# Patient Record
Sex: FEMALE | Race: WHITE | HISPANIC OR LATINO | URBAN - METROPOLITAN AREA
[De-identification: names, ages, dates, MRNs, and addresses within clinical notes are randomized per-mention and may not be internally consistent; named-entity substitution may affect disease eponyms.]

---

## 2019-04-08 ENCOUNTER — WALK IN (OUTPATIENT)
Dept: URGENT CARE | Age: 29
End: 2019-04-08

## 2019-04-08 DIAGNOSIS — Z23 NEED FOR DIPHTHERIA-TETANUS-PERTUSSIS (TDAP) VACCINE: Primary | ICD-10-CM

## 2019-04-08 PROCEDURE — 90471 IMMUNIZATION ADMIN: CPT | Performed by: NURSE PRACTITIONER

## 2019-04-08 PROCEDURE — 90715 TDAP VACCINE 7 YRS/> IM: CPT | Performed by: NURSE PRACTITIONER

## 2019-07-17 ENCOUNTER — APPOINTMENT (OUTPATIENT)
Dept: CT IMAGING | Facility: HOSPITAL | Age: 29
End: 2019-07-17
Attending: PHYSICIAN ASSISTANT

## 2019-07-17 ENCOUNTER — HOSPITAL ENCOUNTER (EMERGENCY)
Facility: HOSPITAL | Age: 29
Discharge: HOME | End: 2019-07-17
Attending: EMERGENCY MEDICINE

## 2019-07-17 ENCOUNTER — APPOINTMENT (OUTPATIENT)
Dept: GENERAL RADIOLOGY | Facility: HOSPITAL | Age: 29
End: 2019-07-17
Attending: PHYSICIAN ASSISTANT

## 2019-07-17 VITALS
RESPIRATION RATE: 15 BRPM | DIASTOLIC BLOOD PRESSURE: 74 MMHG | TEMPERATURE: 97 F | HEIGHT: 65 IN | WEIGHT: 170 LBS | BODY MASS INDEX: 28.32 KG/M2 | OXYGEN SATURATION: 99 % | SYSTOLIC BLOOD PRESSURE: 106 MMHG | HEART RATE: 82 BPM

## 2019-07-17 DIAGNOSIS — R42 DIZZINESS: ICD-10-CM

## 2019-07-17 DIAGNOSIS — S39.012A STRAIN OF LUMBAR REGION, INITIAL ENCOUNTER: ICD-10-CM

## 2019-07-17 DIAGNOSIS — S16.1XXA STRAIN OF NECK MUSCLE, INITIAL ENCOUNTER: Primary | ICD-10-CM
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
## 2019-07-17 PROCEDURE — 99284 EMERGENCY DEPT VISIT MOD MDM: CPT

## 2019-07-17 PROCEDURE — 72100 X-RAY EXAM L-S SPINE 2/3 VWS: CPT | Performed by: PHYSICIAN ASSISTANT

## 2019-07-17 PROCEDURE — 72125 CT NECK SPINE W/O DYE: CPT | Performed by: PHYSICIAN ASSISTANT

## 2019-07-17 PROCEDURE — 70450 CT HEAD/BRAIN W/O DYE: CPT | Performed by: PHYSICIAN ASSISTANT

## 2019-07-17 RX ORDER — IBUPROFEN 600 MG/1
600 TABLET ORAL ONCE
Status: COMPLETED | OUTPATIENT
Start: 2019-07-17 | End: 2019-07-17

## 2019-07-17 RX ORDER — MECLIZINE HYDROCHLORIDE 25 MG/1
25 TABLET ORAL 3 TIMES DAILY PRN
Qty: 10 TABLET | Refills: 0 | Status: SHIPPED | OUTPATIENT
Start: 2019-07-17

## 2019-07-17 RX ORDER — ONDANSETRON 4 MG/1
4 TABLET, ORALLY DISINTEGRATING ORAL EVERY 4 HOURS PRN
Qty: 10 TABLET | Refills: 0 | Status: SHIPPED | OUTPATIENT
Start: 2019-07-17 | End: 2019-07-24

## 2019-07-17 RX ORDER — MECLIZINE HYDROCHLORIDE 25 MG/1
25 TABLET ORAL ONCE
Status: COMPLETED | OUTPATIENT
Start: 2019-07-17 | End: 2019-07-17

## 2019-07-17 RX ORDER — METAXALONE 800 MG/1
800 TABLET ORAL 3 TIMES DAILY PRN
Qty: 12 TABLET | Refills: 0 | Status: SHIPPED | OUTPATIENT
Start: 2019-07-17

## 2019-07-17 RX ORDER — IBUPROFEN 600 MG/1
600 TABLET ORAL EVERY 6 HOURS
Qty: 20 TABLET | Refills: 0 | Status: SHIPPED | OUTPATIENT
Start: 2019-07-17 | End: 2019-07-22

## 2019-07-17 RX ORDER — ACETAMINOPHEN 500 MG
1000 TABLET ORAL ONCE
Status: COMPLETED | OUTPATIENT
Start: 2019-07-17 | End: 2019-07-17

## 2019-07-17 RX ORDER — ONDANSETRON 4 MG/1
4 TABLET, ORALLY DISINTEGRATING ORAL ONCE
Status: COMPLETED | OUTPATIENT
Start: 2019-07-17 | End: 2019-07-17
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
## 2024-06-14 ENCOUNTER — HOSPITAL ENCOUNTER (EMERGENCY)
Facility: HOSPITAL | Age: 34
Discharge: HOME | End: 2024-06-15
Attending: EMERGENCY MEDICINE

## 2024-06-14 ENCOUNTER — ANESTHESIA EVENT (OUTPATIENT)
Dept: SURGERY | Facility: HOSPITAL | Age: 34
End: 2024-06-14
Payer: MEDICAID

## 2024-06-14 ENCOUNTER — ANESTHESIA (OUTPATIENT)
Dept: SURGERY | Facility: HOSPITAL | Age: 34
End: 2024-06-14
Payer: MEDICAID

## 2024-06-14 DIAGNOSIS — O00.101 RIGHT TUBAL PREGNANCY WITHOUT INTRAUTERINE PREGNANCY (HCC): Primary | ICD-10-CM

## 2024-06-14 DIAGNOSIS — O00.90 ECTOPIC PREGNANCY (HCC): Primary | ICD-10-CM

## 2024-06-14 DIAGNOSIS — O00.90 ECTOPIC PREGNANCY (HCC): ICD-10-CM

## 2024-06-14 LAB
ANION GAP SERPL CALC-SCNC: 9 MMOL/L (ref 0–18)
ANTIBODY SCREEN: NEGATIVE
BASOPHILS # BLD AUTO: 0.02 X10(3) UL (ref 0–0.2)
BASOPHILS NFR BLD AUTO: 0.2 %
BILIRUB UR QL STRIP.AUTO: NEGATIVE
BUN BLD-MCNC: 9 MG/DL (ref 9–23)
CALCIUM BLD-MCNC: 9.5 MG/DL (ref 8.5–10.1)
CHLORIDE SERPL-SCNC: 106 MMOL/L (ref 98–112)
CLARITY UR REFRACT.AUTO: CLEAR
CO2 SERPL-SCNC: 21 MMOL/L (ref 21–32)
CREAT BLD-MCNC: 0.74 MG/DL
EGFRCR SERPLBLD CKD-EPI 2021: 111 ML/MIN/1.73M2 (ref 60–?)
EOSINOPHIL # BLD AUTO: 0.01 X10(3) UL (ref 0–0.7)
EOSINOPHIL NFR BLD AUTO: 0.1 %
ERYTHROCYTE [DISTWIDTH] IN BLOOD BY AUTOMATED COUNT: 14.1 %
GLUCOSE BLD-MCNC: 110 MG/DL (ref 70–99)
GLUCOSE UR STRIP.AUTO-MCNC: NORMAL MG/DL
HCT VFR BLD AUTO: 37 %
HGB BLD-MCNC: 12.9 G/DL
IMM GRANULOCYTES # BLD AUTO: 0.05 X10(3) UL (ref 0–1)
IMM GRANULOCYTES NFR BLD: 0.4 %
LEUKOCYTE ESTERASE UR QL STRIP.AUTO: 25
LYMPHOCYTES # BLD AUTO: 1.85 X10(3) UL (ref 1–4)
LYMPHOCYTES NFR BLD AUTO: 14.5 %
MCH RBC QN AUTO: 30.4 PG (ref 26–34)
MCHC RBC AUTO-ENTMCNC: 34.9 G/DL (ref 31–37)
MCV RBC AUTO: 87.1 FL
MONOCYTES # BLD AUTO: 0.22 X10(3) UL (ref 0.1–1)
MONOCYTES NFR BLD AUTO: 1.7 %
NEUTROPHILS # BLD AUTO: 10.64 X10 (3) UL (ref 1.5–7.7)
NEUTROPHILS # BLD AUTO: 10.64 X10(3) UL (ref 1.5–7.7)
NEUTROPHILS NFR BLD AUTO: 83.1 %
NITRITE UR QL STRIP.AUTO: NEGATIVE
OSMOLALITY SERPL CALC.SUM OF ELEC: 281 MOSM/KG (ref 275–295)
PH UR STRIP.AUTO: 6 [PH] (ref 5–8)
PLATELET # BLD AUTO: 353 10(3)UL (ref 150–450)
POTASSIUM SERPL-SCNC: 3.8 MMOL/L (ref 3.5–5.1)
PROT UR STRIP.AUTO-MCNC: NEGATIVE MG/DL
RBC # BLD AUTO: 4.25 X10(6)UL
RH BLOOD TYPE: POSITIVE
SODIUM SERPL-SCNC: 136 MMOL/L (ref 136–145)
SP GR UR STRIP.AUTO: 1.01 (ref 1–1.03)
UROBILINOGEN UR STRIP.AUTO-MCNC: NORMAL MG/DL
WBC # BLD AUTO: 12.8 X10(3) UL (ref 4–11)

## 2024-06-14 PROCEDURE — 86900 BLOOD TYPING SEROLOGIC ABO: CPT | Performed by: EMERGENCY MEDICINE

## 2024-06-14 PROCEDURE — 87086 URINE CULTURE/COLONY COUNT: CPT | Performed by: EMERGENCY MEDICINE

## 2024-06-14 PROCEDURE — 80048 BASIC METABOLIC PNL TOTAL CA: CPT | Performed by: EMERGENCY MEDICINE

## 2024-06-14 PROCEDURE — 86901 BLOOD TYPING SEROLOGIC RH(D): CPT | Performed by: EMERGENCY MEDICINE

## 2024-06-14 PROCEDURE — 36415 COLL VENOUS BLD VENIPUNCTURE: CPT

## 2024-06-14 PROCEDURE — 99285 EMERGENCY DEPT VISIT HI MDM: CPT

## 2024-06-14 PROCEDURE — 86850 RBC ANTIBODY SCREEN: CPT | Performed by: EMERGENCY MEDICINE

## 2024-06-14 PROCEDURE — 87106 FUNGI IDENTIFICATION YEAST: CPT | Performed by: EMERGENCY MEDICINE

## 2024-06-14 PROCEDURE — 81001 URINALYSIS AUTO W/SCOPE: CPT | Performed by: EMERGENCY MEDICINE

## 2024-06-14 PROCEDURE — 85025 COMPLETE CBC W/AUTO DIFF WBC: CPT | Performed by: EMERGENCY MEDICINE

## 2024-06-14 PROCEDURE — 87077 CULTURE AEROBIC IDENTIFY: CPT | Performed by: EMERGENCY MEDICINE

## 2024-06-14 RX ADMIN — SODIUM CHLORIDE, SODIUM LACTATE, POTASSIUM CHLORIDE, CALCIUM CHLORIDE: 600; 310; 30; 20 INJECTION, SOLUTION INTRAVENOUS at 23:58:00

## 2024-06-15 ENCOUNTER — HOSPITAL ENCOUNTER (EMERGENCY)
Facility: HOSPITAL | Age: 34
Discharge: STILL ADMITTED OR ADMINISTRATIVE | End: 2024-06-15

## 2024-06-15 VITALS
RESPIRATION RATE: 16 BRPM | BODY MASS INDEX: 33.15 KG/M2 | SYSTOLIC BLOOD PRESSURE: 126 MMHG | DIASTOLIC BLOOD PRESSURE: 71 MMHG | TEMPERATURE: 99 F | HEIGHT: 65 IN | OXYGEN SATURATION: 98 % | HEART RATE: 100 BPM | WEIGHT: 199 LBS

## 2024-06-15 LAB — RH BLOOD TYPE: POSITIVE

## 2024-06-15 PROCEDURE — 10T24ZZ RESECTION OF PRODUCTS OF CONCEPTION, ECTOPIC, PERCUTANEOUS ENDOSCOPIC APPROACH: ICD-10-PCS | Performed by: OBSTETRICS & GYNECOLOGY

## 2024-06-15 PROCEDURE — 88305 TISSUE EXAM BY PATHOLOGIST: CPT | Performed by: OBSTETRICS & GYNECOLOGY

## 2024-06-15 RX ORDER — PROCHLORPERAZINE EDISYLATE 5 MG/ML
5 INJECTION INTRAMUSCULAR; INTRAVENOUS EVERY 8 HOURS PRN
Status: DISCONTINUED | OUTPATIENT
Start: 2024-06-15 | End: 2024-06-15

## 2024-06-15 RX ORDER — HYDROCODONE BITARTRATE AND ACETAMINOPHEN 5; 325 MG/1; MG/1
TABLET ORAL
Status: COMPLETED
Start: 2024-06-15 | End: 2024-06-15

## 2024-06-15 RX ORDER — EPHEDRINE SULFATE 50 MG/ML
INJECTION INTRAVENOUS AS NEEDED
Status: DISCONTINUED | OUTPATIENT
Start: 2024-06-15 | End: 2024-06-15 | Stop reason: SURG

## 2024-06-15 RX ORDER — ROCURONIUM BROMIDE 10 MG/ML
INJECTION, SOLUTION INTRAVENOUS AS NEEDED
Status: DISCONTINUED | OUTPATIENT
Start: 2024-06-15 | End: 2024-06-15 | Stop reason: SURG

## 2024-06-15 RX ORDER — NALOXONE HYDROCHLORIDE 0.4 MG/ML
0.08 INJECTION, SOLUTION INTRAMUSCULAR; INTRAVENOUS; SUBCUTANEOUS AS NEEDED
Status: DISCONTINUED | OUTPATIENT
Start: 2024-06-15 | End: 2024-06-15

## 2024-06-15 RX ORDER — PHENYLEPHRINE HCL 10 MG/ML
VIAL (ML) INJECTION AS NEEDED
Status: DISCONTINUED | OUTPATIENT
Start: 2024-06-15 | End: 2024-06-15 | Stop reason: SURG

## 2024-06-15 RX ORDER — GLYCOPYRROLATE 0.2 MG/ML
INJECTION, SOLUTION INTRAMUSCULAR; INTRAVENOUS AS NEEDED
Status: DISCONTINUED | OUTPATIENT
Start: 2024-06-15 | End: 2024-06-15 | Stop reason: SURG

## 2024-06-15 RX ORDER — HYDROMORPHONE HYDROCHLORIDE 1 MG/ML
0.2 INJECTION, SOLUTION INTRAMUSCULAR; INTRAVENOUS; SUBCUTANEOUS EVERY 5 MIN PRN
Status: DISCONTINUED | OUTPATIENT
Start: 2024-06-15 | End: 2024-06-15

## 2024-06-15 RX ORDER — ONDANSETRON 2 MG/ML
INJECTION INTRAMUSCULAR; INTRAVENOUS AS NEEDED
Status: DISCONTINUED | OUTPATIENT
Start: 2024-06-15 | End: 2024-06-15 | Stop reason: SURG

## 2024-06-15 RX ORDER — NEOSTIGMINE METHYLSULFATE 1 MG/ML
INJECTION, SOLUTION INTRAVENOUS AS NEEDED
Status: DISCONTINUED | OUTPATIENT
Start: 2024-06-15 | End: 2024-06-15 | Stop reason: SURG

## 2024-06-15 RX ORDER — SODIUM CHLORIDE, SODIUM LACTATE, POTASSIUM CHLORIDE, CALCIUM CHLORIDE 600; 310; 30; 20 MG/100ML; MG/100ML; MG/100ML; MG/100ML
INJECTION, SOLUTION INTRAVENOUS CONTINUOUS PRN
Status: DISCONTINUED | OUTPATIENT
Start: 2024-06-14 | End: 2024-06-15 | Stop reason: SURG

## 2024-06-15 RX ORDER — HYDROCODONE BITARTRATE AND ACETAMINOPHEN 5; 325 MG/1; MG/1
1 TABLET ORAL ONCE AS NEEDED
Status: COMPLETED | OUTPATIENT
Start: 2024-06-15 | End: 2024-06-15

## 2024-06-15 RX ORDER — HYDROMORPHONE HYDROCHLORIDE 1 MG/ML
0.4 INJECTION, SOLUTION INTRAMUSCULAR; INTRAVENOUS; SUBCUTANEOUS EVERY 5 MIN PRN
Status: DISCONTINUED | OUTPATIENT
Start: 2024-06-15 | End: 2024-06-15

## 2024-06-15 RX ORDER — HYDROMORPHONE HYDROCHLORIDE 1 MG/ML
0.6 INJECTION, SOLUTION INTRAMUSCULAR; INTRAVENOUS; SUBCUTANEOUS EVERY 5 MIN PRN
Status: DISCONTINUED | OUTPATIENT
Start: 2024-06-15 | End: 2024-06-15

## 2024-06-15 RX ORDER — HYDROCODONE BITARTRATE AND ACETAMINOPHEN 5; 325 MG/1; MG/1
2 TABLET ORAL ONCE AS NEEDED
Status: COMPLETED | OUTPATIENT
Start: 2024-06-15 | End: 2024-06-15

## 2024-06-15 RX ORDER — DIPHENHYDRAMINE HYDROCHLORIDE 50 MG/ML
12.5 INJECTION INTRAMUSCULAR; INTRAVENOUS AS NEEDED
Status: DISCONTINUED | OUTPATIENT
Start: 2024-06-15 | End: 2024-06-15

## 2024-06-15 RX ORDER — ACETAMINOPHEN 500 MG
1000 TABLET ORAL ONCE AS NEEDED
Status: COMPLETED | OUTPATIENT
Start: 2024-06-15 | End: 2024-06-15

## 2024-06-15 RX ORDER — SODIUM CHLORIDE, SODIUM LACTATE, POTASSIUM CHLORIDE, CALCIUM CHLORIDE 600; 310; 30; 20 MG/100ML; MG/100ML; MG/100ML; MG/100ML
INJECTION, SOLUTION INTRAVENOUS CONTINUOUS
Status: DISCONTINUED | OUTPATIENT
Start: 2024-06-15 | End: 2024-06-15

## 2024-06-15 RX ORDER — MIDAZOLAM HYDROCHLORIDE 1 MG/ML
1 INJECTION INTRAMUSCULAR; INTRAVENOUS EVERY 5 MIN PRN
Status: DISCONTINUED | OUTPATIENT
Start: 2024-06-15 | End: 2024-06-15

## 2024-06-15 RX ORDER — BUPIVACAINE HYDROCHLORIDE 5 MG/ML
INJECTION, SOLUTION EPIDURAL; INTRACAUDAL AS NEEDED
Status: DISCONTINUED | OUTPATIENT
Start: 2024-06-15 | End: 2024-06-15

## 2024-06-15 RX ORDER — KETOROLAC TROMETHAMINE 30 MG/ML
INJECTION, SOLUTION INTRAMUSCULAR; INTRAVENOUS AS NEEDED
Status: DISCONTINUED | OUTPATIENT
Start: 2024-06-15 | End: 2024-06-15 | Stop reason: SURG

## 2024-06-15 RX ORDER — MEPERIDINE HYDROCHLORIDE 25 MG/ML
12.5 INJECTION INTRAMUSCULAR; INTRAVENOUS; SUBCUTANEOUS AS NEEDED
Status: DISCONTINUED | OUTPATIENT
Start: 2024-06-15 | End: 2024-06-15

## 2024-06-15 RX ORDER — LIDOCAINE HYDROCHLORIDE 10 MG/ML
INJECTION, SOLUTION EPIDURAL; INFILTRATION; INTRACAUDAL; PERINEURAL AS NEEDED
Status: DISCONTINUED | OUTPATIENT
Start: 2024-06-15 | End: 2024-06-15 | Stop reason: SURG

## 2024-06-15 RX ORDER — DEXAMETHASONE SODIUM PHOSPHATE 4 MG/ML
VIAL (ML) INJECTION AS NEEDED
Status: DISCONTINUED | OUTPATIENT
Start: 2024-06-15 | End: 2024-06-15 | Stop reason: SURG

## 2024-06-15 RX ORDER — ONDANSETRON 2 MG/ML
4 INJECTION INTRAMUSCULAR; INTRAVENOUS EVERY 6 HOURS PRN
Status: DISCONTINUED | OUTPATIENT
Start: 2024-06-15 | End: 2024-06-15

## 2024-06-15 RX ADMIN — EPHEDRINE SULFATE 5 MG: 50 INJECTION INTRAVENOUS at 00:23:00

## 2024-06-15 RX ADMIN — ROCURONIUM BROMIDE 10 MG: 10 INJECTION, SOLUTION INTRAVENOUS at 01:28:00

## 2024-06-15 RX ADMIN — PHENYLEPHRINE HCL 50 MCG: 10 MG/ML VIAL (ML) INJECTION at 00:23:00

## 2024-06-15 RX ADMIN — DEXAMETHASONE SODIUM PHOSPHATE 8 MG: 4 MG/ML VIAL (ML) INJECTION at 00:06:00

## 2024-06-15 RX ADMIN — NEOSTIGMINE METHYLSULFATE 5 MG: 1 INJECTION, SOLUTION INTRAVENOUS at 01:57:00

## 2024-06-15 RX ADMIN — ONDANSETRON 4 MG: 2 INJECTION INTRAMUSCULAR; INTRAVENOUS at 01:38:00

## 2024-06-15 RX ADMIN — EPHEDRINE SULFATE 5 MG: 50 INJECTION INTRAVENOUS at 00:27:00

## 2024-06-15 RX ADMIN — KETOROLAC TROMETHAMINE 30 MG: 30 INJECTION, SOLUTION INTRAMUSCULAR; INTRAVENOUS at 01:39:00

## 2024-06-15 RX ADMIN — ROCURONIUM BROMIDE 50 MG: 10 INJECTION, SOLUTION INTRAVENOUS at 00:03:00

## 2024-06-15 RX ADMIN — LIDOCAINE HYDROCHLORIDE 50 MG: 10 INJECTION, SOLUTION EPIDURAL; INFILTRATION; INTRACAUDAL; PERINEURAL at 00:01:00

## 2024-06-15 RX ADMIN — GLYCOPYRROLATE 0.8 MG: 0.2 INJECTION, SOLUTION INTRAMUSCULAR; INTRAVENOUS at 01:57:00

## 2024-06-15 NOTE — ED PROVIDER NOTES
Patient Seen in: [facility] Emergency Department      History     Chief Complaint   Patient presents with    Abdomen/Flank Pain     8 wks abdominal pain onset 3pm spotting blood     Stated Complaint:     Subjective:   HPI    33-year-old female presents for evaluation of abdominal pain.  Patient with acute onset right lower quadrant abdominal pain at 3 PM while at work today.  Pain persisted so she presented to outside hospital.  She was diagnosed with a live right sided tubal pregnancy.  She was being prepped for surgery when she signed out AGAINST MEDICAL ADVICE and came here for second opinion.  Very minimal vaginal bleeding.    Objective:   History reviewed. No pertinent past medical history.           History reviewed. No pertinent surgical history.             Social History     Socioeconomic History    Marital status:    Tobacco Use    Smoking status: Never    Smokeless tobacco: Never   Vaping Use    Vaping status: Never Used   Substance and Sexual Activity    Alcohol use: Never    Drug use: Never     Social Determinants of Health     Financial Resource Strain: Not on File (10/6/2022)    Received from [facility]     Financial Resource Strain     Financial Resource Strain: 0   Food Insecurity: Not at Risk (2024)    Received from [facility]     Food Insecurity     Food: 1   Transportation Needs: Not at Risk (2024)    Received from [facility]     Transportation Needs     Transportation: 1   Physical Activity: Not on File (10/6/2022)    Received from [facility]     Physical Activity     Physical Activity: 0   Stress: Not on File (10/6/2022)    Received from [facility]     Stress     Stress: 0   Social Connections: Not on File (10/6/2022)    Received from [facility]     Social Connections     Social Connections and Isolation: 0   Housing Stability: Not at Risk (2024)    Received from [facility]     Housing Stability     Housin              Review of Systems    Positive for stated complaint:   Other systems are as noted  in HPI.  Constitutional and vital signs reviewed.      All other systems reviewed and negative except as noted above.    Physical Exam     ED Triage Vitals [06/14/24 2115]   /85   Pulse 110   Resp 18   Temp 98.5 °F (36.9 °C)   Temp src Temporal   SpO2 98 %   O2 Device None (Room air)       Current Vitals:   Vital Signs  BP: 120/70  Pulse: 98  Resp: 12  Temp: 98.5 °F (36.9 °C)  Temp src: Temporal  MAP (mmHg): 82    Oxygen Therapy  SpO2: 99 %  O2 Device: None (Room air)            Physical Exam    General: Alert, oriented, no apparent distress  HEENT:  Pupils equal reactive.  Extraocular motions intact. MMM.  Neck: Supple  Lungs: Clear to auscultation bilaterally.  Heart: Regular rate and rhythm.  Abdomen: Soft, lower abdominal tenderness  Skin: No rash.  No edema.  Neurologic: No focal neurologic deficits.  Normal speech pattern  Musculoskeletal: No tenderness or deformity noted.  Full range of motion throughout.        ED Course     Labs Reviewed   URINALYSIS WITH CULTURE REFLEX - Abnormal; Notable for the following components:       Result Value    Ketones Urine Trace (*)     Blood Urine 2+ (*)     Leukocyte Esterase Urine 25 (*)     Bacteria Urine Rare (*)     Squamous Epi. Cells Few (*)     All other components within normal limits   BASIC METABOLIC PANEL (8) - Abnormal; Notable for the following components:    Glucose 110 (*)     All other components within normal limits   CBC W/ DIFFERENTIAL - Abnormal; Notable for the following components:    WBC 12.8 (*)     Neutrophil Absolute Prelim 10.64 (*)     Neutrophil Absolute 10.64 (*)     All other components within normal limits   CBC WITH DIFFERENTIAL WITH PLATELET    Narrative:     The following orders were created for panel order CBC With Differential With Platelet.  Procedure                               Abnormality         Status                     ---------                               -----------         ------                     CBC W/  DIFFERENTIAL[[id number]]          Abnormal            Final result                 Please view results for these tests on the individual orders.   TYPE AND SCREEN    Narrative:     The following orders were created for panel order Type and screen.  Procedure                               Abnormality         Status                     ---------                               -----------         ------                     ABORH (Blood Type)[[id number]]                               In process                 Antibody Screen[[id number]]                                  In process                   Please view results for these tests on the individual orders.   ABORH (BLOOD TYPE)   ANTIBODY SCREEN   URINE CULTURE, ROUTINE        EXAM: OB US LESS THAN 14 WEEKS TRANSABDOMINAL AND TV WO OV DOPPLER    CLINICAL INDICATION: Left pelvic pain    COMPARISON: None    TECHNIQUE:  The pelvis was examined via transabdominal and transvaginal approaches.  A transvaginal approach was performed for more detailed evaluation of the gestational sac and fetus.    FINDINGS:    UTERUS:  No focal myometrial abnormalities are appreciated. The uterus measures 10.2 x 5.9 x 6.1 cm. (CC x AP x WIDTH)    GESTATION: There is a single live ectopic pregnancy in the right adnexa; fetal cardiac activity is demonstrated at a rate of 167 beats per minute.  Crown rump length (CRL) is 1.5 cm, corresponding to an estimated gestational age of 8 weeks, 0 days.      RIGHT ADNEXA: The right ovary measures 2.7 x 2.0 x 2.7 cm. (CC x AP x WIDTH)    LEFT ADNEXA: The left ovary is not identified    OTHER FINDINGS: Endometrial stripe measures approximately 27 mm maximum thickness.  Small complex free fluid in the posterior cul-de-sac, perhaps contributory blood products.    Left ovary is not definitively identified, perhaps obscured by location and/or superimposed bowel gas.    IMPRESSION:  1. Single live ectopic pregnancy in the right adnexa, estimated gestational age of  8 weeks and 0 days by crown-rump length.    2. Small possible blood products in the posterior cul-de-sac.      Dr. Moreno reviewed findings with the clinical service, by phone 6/14/2024 6:40 PM.                MDM      Differential diagnosis includes ruptured ectopic pregnancy, right-sided tubal pregnancy, hemoperitoneum      Spoke with Dr. Braxton        Hemoglobin 12.9.    Type and screen in the lab.    Vital signs remained stable while observed                     Medical Decision Making  Amount and/or Complexity of Data Reviewed  External Data Reviewed: notes.     Details: H&P note from earlier today at [facility]        Disposition and Plan     Clinical Impression:  1. Right tubal pregnancy without intrauterine pregnancy (HCC)         Disposition:  There is no disposition on file for this visit.  There is no disposition time on file for this visit.    Follow-up:  No follow-up provider specified.        Medications Prescribed:  Current Discharge Medication List

## 2024-06-15 NOTE — ED QUICK NOTES
Pt is tearful. Pt states she is here for a \"different opinion\" \" she wants to know if there is anything else she can do to save her baby\". Pt left [facility] AMA.

## 2024-06-15 NOTE — ANESTHESIA PREPROCEDURE EVALUATION
PRE-OP EVALUATION    Patient Name: Radha Murry    Admit Diagnosis: Right tubal pregnancy without intrauterine pregnancy (HCC) [O00.101]    Pre-op Diagnosis: Ectopic pregnancy (HCC) [O00.90]    DIAGNOSTIC LAPAROSCOPIC, REMOVAL OF ECTOPIC PREGNANCY POSS. SALPINGECTOMY  POSS. SALPINGO-OOPHERECTOMY- BILATERAL    Anesthesia Procedure: DIAGNOSTIC LAPAROSCOPIC, REMOVAL OF ECTOPIC PREGNANCY POSS. SALPINGECTOMY  POSS. SALPINGO-OOPHERECTOMY- BILATERAL (Bilateral)    Surgeons and Role:     * Gabriele Braxton MD - Primary    Pre-op vitals reviewed.  Temp: 98.5 °F (36.9 °C)  Pulse: 98  Resp: 12  BP: 120/70  SpO2: 99 %  There is no height or weight on file to calculate BMI.    Current medications reviewed.  Hospital Medications:   ceFAZolin (Ancef) 2g in 10mL IV syringe premix   Intravenous PRN    lidocaine PF (Xylocaine-MPF) 1% injection   Injection PRN    propofol (Diprivan) 10 MG/ML injection   Intravenous PRN    succinylcholine (Anectine) 20 MG/ML injection   Intravenous PRN    rocuronium (Zemuron) 50 mg/5mL injection   Intravenous PRN    dexamethasone (Decadron) 4 MG/ML injection   Intravenous PRN    lactated ringers infusion   Intravenous Continuous PRN       Outpatient Medications:     Medications Prior to Admission   Medication Sig Dispense Refill Last Dose    Meclizine HCl 25 MG Oral Tab Take 1 tablet (25 mg total) by mouth 3 (three) times daily as needed. 10 tablet 0     Metaxalone 800 MG Oral Tab Take 1 tablet (800 mg total) by mouth 3 (three) times daily as needed for Pain. 12 tablet 0        Allergies: Patient has no known allergies.      Anesthesia Evaluation    Patient summary reviewed.    Anesthetic Complications  (-) history of anesthetic complications         GI/Hepatic/Renal    Negative GI/hepatic/renal ROS.                             Cardiovascular                (+) obesity                                       Endo/Other    Negative endo/other ROS.                               Pulmonary    Negative pulmonary ROS.                       Neuro/Psych    Negative neuro/psych ROS.                                  History reviewed. No pertinent surgical history.  Social History     Socioeconomic History    Marital status:    Tobacco Use    Smoking status: Never    Smokeless tobacco: Never   Vaping Use    Vaping status: Never Used   Substance and Sexual Activity    Alcohol use: Never    Drug use: Never     History   Drug Use Unknown     Available pre-op labs reviewed.  Lab Results   Component Value Date    WBC 12.8 (H) 06/14/2024    RBC 4.25 06/14/2024    HGB 12.9 06/14/2024    HCT 37.0 06/14/2024    MCV 87.1 06/14/2024    MCH 30.4 06/14/2024    MCHC 34.9 06/14/2024    RDW 14.1 06/14/2024    .0 06/14/2024     Lab Results   Component Value Date     06/14/2024    K 3.8 06/14/2024     06/14/2024    CO2 21.0 06/14/2024    BUN 9 06/14/2024    CREATSERUM 0.74 06/14/2024     (H) 06/14/2024    CA 9.5 06/14/2024            Airway      Mallampati: III  Mouth opening: 3 FB  TM distance: 4 - 6 cm   Cardiovascular             Dental  Comment: No loose teeth per patient               Pulmonary                     Other findings              ASA: 2 and emergent  Plan: general  NPO status verified and   Beta blockers in last 24 hours: .geta.  Post-procedure pain management plan discussed with surgeon and patient.    Comment: GETA/LMA discussed in detail.  Risk of complications discussed including but not limited to sore throat, cough, PONV discussed. Also, discussed risks including dental injury particularly on any weakened, treated or diseased teeth & pt wishes to proceed  All questions answered.    Plan/risks discussed with: patient  Use of blood product(s) discussed with: patient    Consented to blood products.          Present on Admission:  **None**

## 2024-06-15 NOTE — BRIEF OP NOTE
Goal Outcome Evaluation:  Plan of Care Reviewed With: patient        Progress: improving  Outcome Summary: Pt discharged to home with family,   Pre-Operative Diagnosis: Ectopic Pregnancy     Post-Operative Diagnosis: Same With Some Anterior Wall Abdominal Adhesions And Left Ovarian Cyst     Procedure Performed:  Laparoscopy, Operative Laparoscopy, Pelviscopy And Right Sided Salpingectomy With Removal Of Ectopic Pregnancy    Surgeon:  Dr. Braxton    Assistant:  Kaley Mosher CSA     Surgical Findings:  Please See Dictated OR Report     Specimen:  Ectopic Pregnancy And Right Sided Fallopian Tube     Estimated Blood Loss:  50 ml    Gabriele Braxton M.D.  6/15/2024  3:26 AM

## 2024-06-15 NOTE — ANESTHESIA PROCEDURE NOTES
Airway  Date/Time: 6/15/2024 12:04 AM  Urgency: elective      General Information and Staff    Patient location during procedure: OR  Anesthesiologist: Wood Metcalf MD  Performed: anesthesiologist   Performed by: Wood Metcalf MD  Authorized by: Wood Metcalf MD      Indications and Patient Condition  Indications for airway management: anesthesia  Sedation level: deep  Preoxygenated: yes  Patient position: sniffing  Mask difficulty assessment: 0 - not attempted    Final Airway Details  Final airway type: endotracheal airway      Successful airway: ETT  Cuffed: yes   Successful intubation technique: Video laryngoscopy  Facilitating devices/methods: rapid sequence intubation  Endotracheal tube insertion site: oral  Blade: GlideScope  Blade size: #3  ETT size (mm): 7.0    Placement verified by: capnometry   Cuff volume (mL): 8  Measured from: lips  ETT to lips (cm): 21  Number of attempts at approach: 1    Additional Comments  atraumatic

## 2024-06-15 NOTE — ANESTHESIA POSTPROCEDURE EVALUATION
[facility]    Radha Murry Patient Status:  Emergency   Age/Gender 33 year old female MRN [id number]   Location [facility] SURGERY Attending Gabriele Braxton,*   Hosp Day # 0 PCP None Pcp       Anesthesia Post-op Note    DIAGNOSTIC LAPAROSCOPIC, REMOVAL OF ECTOPIC PREGNANCY, SALPINGECTOMY &. SALPINGO-OOPHERECTOMY- RIGHT    Procedure Summary       Date: 06/14/24 Room / Location:  MAIN OR [address] [place] [address] MAIN OR    Anesthesia Start: 2350 Anesthesia Stop: 06/15/24 0214    Procedure: DIAGNOSTIC LAPAROSCOPIC, REMOVAL OF ECTOPIC PREGNANCY, SALPINGECTOMY &. SALPINGO-OOPHERECTOMY- RIGHT (Bilateral: Abdomen) Diagnosis:       Ectopic pregnancy (HCC)      (Ectopic pregnancy (HCC) [O00.90])    Surgeons: Gabriele Braxton MD Anesthesiologist: Wood Metcalf MD    Anesthesia Type: general ASA Status: 2 - Emergent            Anesthesia Type: general    Vitals Value Taken Time   /51 06/15/24 0211   Temp 97 06/15/24 0214   Pulse 105 06/15/24 0213   Resp 14 06/15/24 0213   SpO2 100 % 06/15/24 0213   Vitals shown include unfiled device data.    Patient Location: PACU    Anesthesia Type: general    Airway Patency: patent    Postop Pain Control: adequate    Mental Status: mildly sedated but able to meaningfully participate in the post-anesthesia evaluation    Nausea/Vomiting: none    Cardiopulmonary/Hydration status: stable euvolemic    Complications: no apparent anesthesia related complications    Postop vital signs: stable    Dental Exam: Unchanged from Preop    Patient to be discharged from PACU when criteria met.

## 2024-06-15 NOTE — H&P
[facility]    PATIENT'S NAME: JOSE A GONSALES   ATTENDING PHYSICIAN: Gabriele Braxton M.D.   PATIENT ACCOUNT#:   [id number]    LOCATION:  [facility] OR [facility] 2 [facility] 10  MEDICAL RECORD #:   [id number]       YOB: 1990  ADMISSION DATE:       2024    HISTORY AND PHYSICAL EXAMINATION    This patient was assigned to our service secondary to the fact that our service was on-call for the emergency department at the time that this patient presented.  Please note that since this patient spoke Uzbek, a  was used to obtain information from the patient.    CHIEF COMPLAINT:  The patient presents with a live right-sided ectopic pregnancy.    HISTORY OF PRESENT ILLNESS:  Patient is a 33-year-old female,  2, para 1-0-0-1, last menstrual period was on 2024, with the patient presenting with the above chief complaint.  The patient had noted lower abdominal discomfort starting approximately at 3:00 p.m. on 2024.  The patient had noted vaginal spotting at approximately 7:00 p.m. on 2024.  The patient had gone to another hospital where she was evaluated and was noted to have a live right-sided ectopic pregnancy.  Please see Dr. Casillas's note regarding this patient.  In her note, she states that while the patient was being prepped for surgery (at the other hospital) she signed out AGAINST MEDICAL ADVICE and came here (which would be [facility]) for (a) second opinion.  The patient denies having a fever.  Patient denies having history of pelvic inflammatory disease.      PAST MEDICAL HISTORY:  On 2014, the patient had a  section for apparent fetal distress at approximately 37 weeks' gestation.     MEDICATIONS:  Prenatal Vitamin 1 tab p.o. daily.    ALLERGIES:  She reports no known allergies.     SOCIAL HISTORY:  The patient denied smoking cigarettes.  The patient denied drinking alcoholic beverages.    FAMILY HISTORY:  Essentially  unremarkable.    REVIEW OF SYSTEMS:  Otherwise, noncontributory.      PHYSICAL EXAMINATION:    VITAL SIGNS:  Temperature of 98.5 degrees Fahrenheit, pulse of 98 per minute, respiration rate of 12 per minute, blood pressure of 120/70.  HEENT:  Normocephalic.  LUNGS:  Clear to auscultation.  HEART:  S1, S2.  Normal sinus rhythm.  ABDOMEN:  Soft, essentially nontender.  No rebound was elicited.    PELVIC:  Exam was deferred secondary to the fact that the pelvic ultrasound done at the other hospital had revealed a right-sided ectopic pregnancy with a heartbeat of 167 beats per minute.  This ultrasound was done at the other hospital on 06/14/2024 at 1844.  EXTREMITIES:  Grossly within normal limits.  NEUROLOGIC:  Grossly within normal limits.    LABORATORY DATA:  Please see chart for details.  The patient had a blood typing done on 06/14/2024 at 2210 and the patient's blood type was noted to be O and the patient's Rh factor was noted to be positive.  A CBC done on 06/14/2024 at 2210 revealed WBC count equal to 12.8, hemoglobin equal to 12.9, platelet count was noted to be 353,000.      As noted above, a pelvic ultrasound done at the other hospital had revealed a live ectopic pregnancy in the right adnexal area.  This ectopic pregnancy had a heartbeat of 167 beats per minute and the size of the ectopic pregnancy was approximately 8 weeks.  As noted above, this ultrasound was performed on 06/14/2024 at 1844.    IMPRESSION:  Live ectopic pregnancy on the patient's right side.      PLAN:  In view of this, discussion was carried out with the patient using a .  The patient is fully aware that she has a live ectopic pregnancy on the right side and that this is a serious problem that needs to be addressed as soon as possible secondary to the fact that over time, in some situations that of these type of ectopic pregnancies could rupture and the patient could develop a hemoperitoneum.  The patient was made aware at the  other hospital, that she had been at, as well as with our discussion, that this pregnancy is considered not viable and it cannot be placed in her uterus, at this point in time, to become a viable pregnancy.  Therefore, the only solution for this live ectopic pregnancy, at this point in time, is to remove it.  Therefore, the risks and benefits of doing surgery were reviewed with the patient.  Some of the risks discussed with the patient included: the risk of bleeding, infection, potential injury to intra-abdominal organs, etc.  The patient was made aware that due to the size of this ectopic pregnancy, that most likely her right fallopian tube will have to be removed at the time that this ectopic pregnancy is removed.  It was explained to the patient that she still will have a fallopian tube on her other side for future pregnancies.  Options to help the patient get pregnant in the future were reviewed with the patient.  The patient is fully aware of the urgency of doing this procedure to potentially prevent her from getting a hemoperitoneum.  Therefore, decision was made to proceed with doing the surgery as soon as possible.  The patient agrees with the proposed management.  The patient had time to ask questions and all questions were answered.  Prior to doing the surgery, the patient will sign an informed consent for the proposed surgery.     Dictated By Gabriele Braxton M.D.  d: 06/15/2024 05:50:24  t: 06/15/2024 07:54:56  [facility] [id number]  MTF/

## 2024-06-15 NOTE — DISCHARGE INSTRUCTIONS
You were given  Toradol 30mg at 1:40 AM. This is similar to Motrin. The next dose of Motrin(Ibuprofen) can be taken at 7:40AM.    You were given Norco 5-325mg at 3:25 AM. This can make you sleepy(so alcohol or driving  when taking it.     You are receiving a prescription for Tylenol #3,which you can take it every 4 hours as needed for severe pain. It has 1 dose of regular Tylenol in it.    You can take Regular Strength Tylenol for mild-moderate pain every 4-6 hours.    Follow up with either your doctor or Dr Braxton in 1 week.  [address]              Phone: [phone]                                                                                                 [facility] [address]                                   Fax : [phone]                                                                                                 [place] [address]    Notify either doctor if you have any signs of infection(redness/swelling/fever/chills).    No lifting of anything heavier than a gallon of milk for 2 weeks. No bending/lifting for 4 weeks.

## 2024-06-17 NOTE — OPERATIVE REPORT
[facility]    PATIENT'S NAME: JOSE A GONSALES   ATTENDING PHYSICIAN: Gabriele Braxton M.D.   OPERATING PHYSICIAN: Gabriele Braxton M.D.   PATIENT ACCOUNT#:   [id number]    LOCATION:  [facility] 2 [facility] 10  MEDICAL RECORD #:   [id number]       YOB: 1990  ADMISSION DATE:       06/14/2024      OPERATION DATE:  06/14/2024    OPERATIVE REPORT    PREOPERATIVE DIAGNOSIS:  Live ectopic pregnancy.  POSTOPERATIVE DIAGNOSIS:  Live ectopic pregnancy, with small anterior wall abdominal adhesions and left ovarian cyst.  PROCEDURE:  Operative laparoscopy, pelviscopy and right-sided salpingectomy with removal of ectopic pregnancy.    ASSISTANT:  Kaley Mosher CSA    ANESTHESIOLOGIST:  Wood Metcalf MD    ANESTHETIC:  General via endotracheal tube.    FINDINGS:  The right-sided ectopic pregnancy was noted in the right fallopian tube, and this was a relatively large ectopic pregnancy measuring approximately 5 cm in length and approximately 5 cm in width.  The patient's uterus appeared to be grossly within normal limits.  The patient's right ovary appeared to be grossly within normal limits.  The patient's left ovary was noted to have an approximately 1 to 2 cm diameter simple appearing cyst.  The left fallopian tube appeared to be grossly within normal limits.  Some adhesions were noted on the anterior abdominal wall of the abdomen intraperitoneally near the umbilicus.  Please note that the patient's blood type is O positive.      OPERATIVE TECHNIQUE:  Prior to bringing the patient to the operative suite, the patient had signed an informed consent.  The patient received 2 g Ancef via the IV route prior to the start of procedure for prophylactic reasons.  While the patient was in the OR, she had SCDs on her lower extremities.  The patient was placed under general endotracheal anesthesia by Dr. Metcalf.  The patient was properly prepped and draped.  The patient was placed in dorsal lithotomy  position.  A Moreno catheter was easily inserted into the patient's bladder.  A speculum was placed in the vaginal vault.  A Jarcho retractor was placed into the cervix and secured using an Allis clamp on the cervix.  Surgeon changed gloves, and the laparoscopic part of the procedure was begun. Local was placed into the inferior aspect of the periumbilical area using 1% lidocaine.  Prior to injecting local in this area, aspiration was done.  Using a #11 blade, an incision was made in the skin in this area.  An attempt. at this point in time, was to get a Veress needle into the proper position into the intra-abdominal cavity.  However, after a couple attempts, it was noted that the pressure that was registered on the monitor was higher than what would be wanted when trying to insufflate.  Therefore, the Veress needle was taken out a couple times from the incision in the inferior aspect of the periumbilical area.  Decision at this point was then made to place a Veress needle in the left upper quadrant after the patient was placed in the reverse Trendelenburg position and after the patient had an OG placed by the anesthesiologist that was connected to suction.  After the above was done, local using 1% Lidocaine was injected in the area in the left upper quadrant.  Prior to injecting the local into this area, aspiration was done.An incision was made in this area using a #11 blade.  A Veress needle was then introduced into the peritoneal cavity, and the pressures were noted to be correct.  Peritoneal cavity was filled with CO2 gas until tympanitic.  A 5 mm trocar and sheath were easily introduced into the periumbilical area, and then into the peritoneal cavity.  The scope was placed into the sheath after the trocar was removed.  Visualization of the pelvic contents was carried out and the findings described above were noted.  Two additional 5 mm ports were then placed in the patient's mid-abdomen, 1 on the left side and 1  on the right side.  Prior to placing these ports, local anesthetic using 1% Lidocaine had been injected in the areas where these ports would be placed.  Prior to injecting the local in these areas, aspiration was done.  Small skin incisions were made using a #11 blade.  On the left side, the trocar and sheath were easily introduced into the peritoneal cavity under direct visualization and the same thing was done on the right side.  At this point, the patient was placed in Trendelenburg position.  Visualization of the anatomy was again carried out and the above findings were noted.  Due to the fact that the patient had an ectopic pregnancy in her right fallopian tube and this fallopian tube was distorted by this ectopic pregnancy, a decision was made to proceed with a right-sided salpingectomy with removal of this ectopic pregnancy.  To aid in visualization, a 10 mm trocar and sheath were placed in the periumbilical area after widening these incisions and removing the 5 mm sheath in that area.  The trocar from the 10 mm sheath was then removed.  Attention was now paid to removing the right-sided ectopic pregnancy and the right fallopian tube.  This was accomplished by using a Sonicision and  a PK.  After freeing up the right-sided fallopian tube and ectopic pregnancy, the small stub of the fallopian tube that was noted at the fundus was then cauterized and sealed.  This stub was noted to be approximately 1.5 to 2 cm in width.  The peritoneal cavity was irrigated with warm irrigation solution.  Hemostasis was checked and was achieved.  At this point, the ectopic pregnancy and the right fallopian tube were removed by using a bag that was placed into the intraperitoneal cavity.  This bag was  then pulled up through the incision along with the sheath and the specimen was removed intact from this bag.  The bag was removed and this bag was noted to be intact.  Specimen is to be sent to the lab for analysis.  The peritoneal  cavity was re-irrigated with warm irrigation solution.  Hemostasis was checked and was achieved.  The pneumoperitoneum was released.  The sheaths that were remaining were removed.  The skin incisions were then closed.  Prior to closing the skin incision in the periumbilical area, the incision in the fascia was reapproximated using 0 Vicryl suture.  The skin  incisions where the 3 mm port and the 10 mm port were placed were closed in a subcuticular fashion using 4-0 Vicryl suture.  The top layer of the skin edges were then covered with glue.  Towards the end of the procedure, the Jarcho retractor and Allis had come off the patient's cervix.  At the end of the procedure, the Carroll catheter was removed intact without difficulty after deflating the balloon by withdrawing the liquid that had been placed in this balloon when the carroll catheter hd been placed.  Estimated blood loss was noted to be approximately 50 mL.  Approximately 200 mL of yellow urine was noted in the Carroll bag at the end of the procedure.  The patient tolerated the procedure well.  She was taken out of the dorsal lithotomy position, awakened from the general anesthetic and transferred to the recovery room in stable condition  The patient will be the in the recovery room for both recovery room purposes as well as for the remainder of her hospital stay.  Postoperatively, discharge instructions were reviewed with the patient.  Some of the discharge instructions were reviewed with the patient included the following:  Please note, that when the discharge instructions, etc. were reviewed with the patient that she was able to communicate, without difficulty, in English.    MEDICATIONS:  The patient was advised that she could take over-the-counter Motrin as directed on the box p.r.n. for pain.  The patient was given a prescription for: Tylenol No. 3, #9, Si tablet p.o. q.4 h. p.r.n. pain with 1 refill on this prescription.      DIET:  General diet as  tolerated.      ACTIVITY:  Pelvic rest.  Avoid strenuous activity.  Keep bowel movements soft, etc.  The patient is advised to call if she would note increased vaginal bleeding, pain, fever, etc.     FOLLOWUP:  The patient was advised to call our office for a followup appointment in approximately 1 week or p.r.n.  The findings, etc., that were noted at time of the surgery were reviewed with the patient after she had awakened from the general anesthetic.  The patient had time to ask questions and all questions were answered.     Dictated By Gabriele Braxton M.D.  d: 06/15/2024 06:33:28  t: 06/15/2024 13:36:39  [facility] [id number]  [facility]/

## 2024-06-25 ENCOUNTER — TELEPHONE (OUTPATIENT)
Dept: OBGYN UNIT | Facility: HOSPITAL | Age: 34
End: 2024-06-25

## (undated) DEVICE — [HIGH FLOW INSUFFLATOR,  DO NOT USE IF PACKAGE IS DAMAGED,  KEEP DRY,  KEEP AWAY FROM SUNLIGHT,  PROTECT FROM HEAT AND RADIOACTIVE SOURCES.]: Brand: PNEUMOSURE

## (undated) DEVICE — TROCAR: Brand: KII FIOS FIRST ENTRY

## (undated) DEVICE — UNDYED BRAIDED (POLYGLACTIN 910), SYNTHETIC ABSORBABLE SUTURE: Brand: COATED VICRYL

## (undated) DEVICE — TROCAR: Brand: KII® SLEEVE

## (undated) DEVICE — BANDAGE ADH 1INX3IN NAT FAB N ADH PD CURAD

## (undated) DEVICE — 3M™ STERI-STRIP™ REINFORCED ADHESIVE SKIN CLOSURES, R1541, 1/4 IN X 3 IN (6 MM X 75 MM), 3 STRIPS/ENVELOPE: Brand: 3M™ STERI-STRIP™

## (undated) DEVICE — GLOVE SUR 7.5 SENSICARE PI PIP CRM PWD F

## (undated) DEVICE — INJECTOR MANIP L13CM DIA5MM BLLN TIP KRONNER

## (undated) DEVICE — ANTIBACTERIAL VIOLET BRAIDED (POLYGLACTIN 910), SYNTHETIC ABSORBABLE SUTURE: Brand: COATED VICRYL

## (undated) DEVICE — GOWN,SIRUS,FABRIC-REINFORCED,X-LARGE: Brand: MEDLINE

## (undated) DEVICE — GYN LAP/ROBOTIC: Brand: MEDLINE INDUSTRIES, INC.

## (undated) DEVICE — CURVED JAW CORDLESS ULTRASONIC DISSECTOR: Brand: SONICISION 7

## (undated) DEVICE — ANCHOR TISSUE RETRIEVAL SYSTEM, BAG SIZE 175 ML, PORT SIZE 10 MM: Brand: ANCHOR TISSUE RETRIEVAL SYSTEM

## (undated) DEVICE — SLEEVE COMPR MD KNEE LEN SGL USE KENDALL SCD

## (undated) DEVICE — POUCH SPECIMEN WIRE 6X3 250ML

## (undated) DEVICE — SOLUTION IRRIG 1000ML 0.9% NACL USP BTL

## (undated) DEVICE — 1200CC GUARDIAN II: Brand: GUARDIAN

## (undated) DEVICE — SUT COAT VCRL+ 0 27IN UR-6 ABSRB VLT ANTIBACT

## (undated) DEVICE — INSUFFLATION NEEDLE TO ESTABLISH PNEUMOPERITONEUM.: Brand: INSUFFLATION NEEDLE

## (undated) DEVICE — HUNTER GASPER TIP, DISPOSABLE: Brand: RENEW

## (undated) DEVICE — PLUMEPORT ACTIV LAPAROSCOPIC SMOKE FILTRATION DEVICE: Brand: PLUMEPORT ACTIVE

## (undated) DEVICE — ENDOCUT SCISSOR TIP, DISPOSABLE: Brand: RENEW

## (undated) NOTE — LETTER
[address]  [id number]  Authorization for Surgical Operation and Procedure     Date:___________                                                                                                         Time:__________  I hereby authorize Surgeon(s):  Gabriele Braxton MD, my physician and his/her assistants (if applicable), which may include medical students, residents, and/or fellows, to perform the following surgical operation/ procedure and administer such anesthesia as may be determined necessary by my physician:  Operation/Procedure name (s) Procedure(s):  DIAGNOSTIC LAPAROSCOPIC, REMOVAL OF ECTOPIC PREGNANCY POSS. SALPINGECTOMY  POSS. SALPINGO-OOPHERECTOMY on Radha Murry   2.   I recognize that during the surgical operation/procedure, unforeseen conditions may necessitate additional or different procedures than those listed above.  I, therefore, further authorize and request that the above-named surgeon, assistants, or designees perform such procedures as are, in their judgment, necessary and desirable.    3.   My surgeon/physician has discussed prior to my surgery the potential benefits, risks and side effects of this procedure; the likelihood of achieving goals; and potential problems that might occur during recuperation.  They also discussed reasonable alternatives to the procedure, including risks, benefits, and side effects related to the alternatives and risks related to not receiving this procedure.  I have had all my questions answered and I acknowledge that no guarantee has been made as to the result that may be obtained.    4.   Should the need arise during my operation/procedure, which includes change of level of care prior to discharge, I also consent to the administration of blood and/or blood products.  Further, I understand that despite careful testing and screening of blood or blood products by collecting agencies, I may still be subject to ill  effects as a result of receiving a blood transfusion and/or blood products.  The following are some, but not all, of the potential risks that can occur: fever and allergic reactions, hemolytic reactions, transmission of diseases such as Hepatitis, AIDS and Cytomegalovirus (CMV) and fluid overload.  In the event that I wish to have an autologous transfusion of my own blood, or a directed donor transfusion, I will discuss this with my physician.  Check only if Refusing Blood or Blood Products  I understand refusal of blood or blood products as deemed necessary by my physician may have serious consequences to my condition to include possible death. I hereby assume responsibility for my refusal and release the hospital, its personnel, and my physicians from any responsibility for the consequences of my refusal.          o  Refuse      5.   I authorize the use of any specimen, organs, tissues, body parts or foreign objects that may be removed from my body during the operation/procedure for diagnosis, research or teaching purposes and their subsequent disposal by hospital authorities.  I also authorize the release of specimen test results and/or written reports to my treating physician on the hospital medical staff or other referring or consulting physicians involved in my care, at the discretion of the Pathologist or my treating physician.    6.   I consent to the photographing or videotaping of the operations or procedures to be performed, including appropriate portions of my body for medical, scientific, or educational purposes, provided my identity is not revealed by the pictures or by descriptive texts accompanying them.  If the procedure has been photographed/videotaped, the surgeon will obtain the original picture, image, videotape or CD.  The hospital will not be responsible for storage, release or maintenance of the picture, image, tape or CD.    7.   I consent to the presence of a  or observers  in the operating room as deemed necessary by my physician or their designees.    8.   I recognize that in the event my procedure results in extended X-Ray/fluoroscopy time, I may develop a skin reaction.    9. If I have a Do Not Attempt Resuscitation (DNAR) order in place, that status will be suspended while in the operating room, procedural suite, and during the recovery period unless otherwise explicitly stated by me (or a person authorized to consent on my behalf). The surgeon or my attending physician will determine when the applicable recovery period ends for purposes of reinstating the DNAR order.  10. Patients having a sterilization procedure: I understand that if the procedure is successful the results will be permanent and it will therefore be impossible for me to inseminate, conceive, or bear children.  I also understand that the procedure is intended to result in sterility, although the result has not been guaranteed.   11. I acknowledge that my physician has explained sedation/analgesia administration to me including the risk and benefits I consent to the administration of sedation/analgesia as may be necessary or desirable in the judgment of my physician.    I CERTIFY THAT I HAVE READ AND FULLY UNDERSTAND THE ABOVE CONSENT TO OPERATION and/or OTHER PROCEDURE.    _________________________________________  __________________________________  Signature of Patient     Signature of Responsible Person         ___________________________________         Printed Name of Responsible Person           _________________________________                 Relationship to Patient  _________________________________________  ______________________________  Signature of Witness          Date  Time      Patient Name: Radha Murry     : 1990                 Printed: 2024     Medical Record #: [id number]                     Page 1 of 2                                    [address]  [place]  [address]    Consent for Anesthesia    IRadha agree to be cared for by an anesthesiologist, who is specially trained to monitor me and give me medicine to put me to sleep or keep me comfortable during my procedure    I understand that my anesthesiologist is not an employee or agent of [facility] or [employer]. He or she works for [employer].    As the patient asking for anesthesia services, I agree to:  Allow the anesthesiologist (anesthesia doctor) to give me medicine and do additional procedures as necessary. Some examples are: Starting or using an “IV” to give me medicine, fluids or blood during my procedure, and having a breathing tube placed to help me breathe when I’m asleep (intubation). In the event that my heart stops working properly, I understand that my anesthesiologist will make every effort to sustain my life, unless otherwise directed by [facility] Do Not Resuscitate documents.  Tell my anesthesia doctor before my procedure:  If I am pregnant.  The last time that I ate or drank.  All of the medicines I take (including prescriptions, herbal supplements, and pills I can buy without a prescription (including street drugs/illegal medications). Failure to inform my anesthesiologist about these medicines may increase my risk of anesthetic complications.  If I am allergic to anything or have had a reaction to anesthesia before.  I understand how the anesthesia medicine will help me (benefits).  I understand that with any type of anesthesia medicine there are risks:  The most common risks are: nausea, vomiting, sore throat, muscle soreness, damage to my eyes, mouth, or teeth (from breathing tube placement).  Rare risks include: remembering what happened during my procedure, allergic reactions to medications, injury to my airway, heart, lungs, vision, nerves, or muscles and in extremely rare instances death.  My doctor has explained to me  other choices available to me for my care (alternatives).  Pregnant Patients (“epidural”):  I understand that the risks of having an epidural (medicine given into my back to help control pain during labor), include itching, low blood pressure, difficulty urinating, headache or slowing of the baby’s heart. Very rare risks include infection, bleeding, seizure, irregular heart rhythms and nerve injury.  Regional Anesthesia (“spinal”, “epidural”, & “nerve blocks”):  I understand that rare but potential complications include headache, bleeding, infection, seizure, irregular heart rhythms, and nerve injury.    I can change my mind about having anesthesia services at any time before I get the medicine.    _____________________________________________________________________________  Patient (or Representative) Signature/Relationship to Patient  Date   Time    _____________________________________________________________________________   Name (if used)    Language/Organization   Time    _____________________________________________________________________________  Anesthesiologist Signature     Date   Time  I have discussed the procedure and information above with the patient (or patient’s representative) and answered their questions. The patient or their representative has agreed to have anesthesia services.    _____________________________________________________________________________  Witness        Date   Time  I have verified that the signature is that of the patient or patient’s representative, and that it was signed before the procedure  Patient Name: Radha Murry     : 1990                 Printed: 2024     Medical Record #: [id number]                     Page 2 of 2

## (undated) NOTE — LETTER
[address]  [address]  Authorization for Surgical Operation and Procedure     Date:___________                                                                                                         Time:__________  I hereby authorize Surgeon(s):  Gabriele Braxton MD, my physician and his/her assistants (if applicable), which may include medical students, residents, and/or fellows, to perform the following surgical operation/ procedure and administer such anesthesia as may be determined necessary by my physician:  Operation/Procedure name (s) Procedure(s):  DIAGNOSTIC LAPAROSCOPIC, REMOVAL OF ECTOPIC PREGNANCY POSS. SALPINGECTOMY  POSS. SALPINGO-OOPHERECTOMY- BILATERAL on Radha Murry   2.   I recognize that during the surgical operation/procedure, unforeseen conditions may necessitate additional or different procedures than those listed above.  I, therefore, further authorize and request that the above-named surgeon, assistants, or designees perform such procedures as are, in their judgment, necessary and desirable.    3.   My surgeon/physician has discussed prior to my surgery the potential benefits, risks and side effects of this procedure; the likelihood of achieving goals; and potential problems that might occur during recuperation.  They also discussed reasonable alternatives to the procedure, including risks, benefits, and side effects related to the alternatives and risks related to not receiving this procedure.  I have had all my questions answered and I acknowledge that no guarantee has been made as to the result that may be obtained.    4.   Should the need arise during my operation/procedure, which includes change of level of care prior to discharge, I also consent to the administration of blood and/or blood products.  Further, I understand that despite careful testing and screening of blood or blood products by collecting agencies, I may still be subject  to ill effects as a result of receiving a blood transfusion and/or blood products.  The following are some, but not all, of the potential risks that can occur: fever and allergic reactions, hemolytic reactions, transmission of diseases such as Hepatitis, AIDS and Cytomegalovirus (CMV) and fluid overload.  In the event that I wish to have an autologous transfusion of my own blood, or a directed donor transfusion, I will discuss this with my physician.  Check only if Refusing Blood or Blood Products  I understand refusal of blood or blood products as deemed necessary by my physician may have serious consequences to my condition to include possible death. I hereby assume responsibility for my refusal and release the hospital, its personnel, and my physicians from any responsibility for the consequences of my refusal.          o  Refuse      5.   I authorize the use of any specimen, organs, tissues, body parts or foreign objects that may be removed from my body during the operation/procedure for diagnosis, research or teaching purposes and their subsequent disposal by hospital authorities.  I also authorize the release of specimen test results and/or written reports to my treating physician on the hospital medical staff or other referring or consulting physicians involved in my care, at the discretion of the Pathologist or my treating physician.    6.   I consent to the photographing or videotaping of the operations or procedures to be performed, including appropriate portions of my body for medical, scientific, or educational purposes, provided my identity is not revealed by the pictures or by descriptive texts accompanying them.  If the procedure has been photographed/videotaped, the surgeon will obtain the original picture, image, videotape or CD.  The hospital will not be responsible for storage, release or maintenance of the picture, image, tape or CD.    7.   I consent to the presence of a  or  observers in the operating room as deemed necessary by my physician or their designees.    8.   I recognize that in the event my procedure results in extended X-Ray/fluoroscopy time, I may develop a skin reaction.    9. If I have a Do Not Attempt Resuscitation (DNAR) order in place, that status will be suspended while in the operating room, procedural suite, and during the recovery period unless otherwise explicitly stated by me (or a person authorized to consent on my behalf). The surgeon or my attending physician will determine when the applicable recovery period ends for purposes of reinstating the DNAR order.  10. Patients having a sterilization procedure: I understand that if the procedure is successful the results will be permanent and it will therefore be impossible for me to inseminate, conceive, or bear children.  I also understand that the procedure is intended to result in sterility, although the result has not been guaranteed.   11. I acknowledge that my physician has explained sedation/analgesia administration to me including the risk and benefits I consent to the administration of sedation/analgesia as may be necessary or desirable in the judgment of my physician.    I CERTIFY THAT I HAVE READ AND FULLY UNDERSTAND THE ABOVE CONSENT TO OPERATION and/or OTHER PROCEDURE.    _________________________________________  __________________________________  Signature of Patient     Signature of Responsible Person         ___________________________________         Printed Name of Responsible Person           _________________________________                 Relationship to Patient  _________________________________________  ______________________________  Signature of Witness          Date  Time      Patient Name: Radha Murry     : 1990                 Printed: 2024     Medical Record #: [id number]                     Page 1 of 2                                    [address]  [place]  [address]    Consent for Anesthesia    IRadha agree to be cared for by an anesthesiologist, who is specially trained to monitor me and give me medicine to put me to sleep or keep me comfortable during my procedure    I understand that my anesthesiologist is not an employee or agent of [facility] or [employer]. He or she works for [employer].    As the patient asking for anesthesia services, I agree to:  Allow the anesthesiologist (anesthesia doctor) to give me medicine and do additional procedures as necessary. Some examples are: Starting or using an “IV” to give me medicine, fluids or blood during my procedure, and having a breathing tube placed to help me breathe when I’m asleep (intubation). In the event that my heart stops working properly, I understand that my anesthesiologist will make every effort to sustain my life, unless otherwise directed by [facility] Do Not Resuscitate documents.  Tell my anesthesia doctor before my procedure:  If I am pregnant.  The last time that I ate or drank.  All of the medicines I take (including prescriptions, herbal supplements, and pills I can buy without a prescription (including street drugs/illegal medications). Failure to inform my anesthesiologist about these medicines may increase my risk of anesthetic complications.  If I am allergic to anything or have had a reaction to anesthesia before.  I understand how the anesthesia medicine will help me (benefits).  I understand that with any type of anesthesia medicine there are risks:  The most common risks are: nausea, vomiting, sore throat, muscle soreness, damage to my eyes, mouth, or teeth (from breathing tube placement).  Rare risks include: remembering what happened during my procedure, allergic reactions to medications, injury to my airway, heart, lungs, vision, nerves, or muscles and in extremely rare instances death.  My doctor has explained  to me other choices available to me for my care (alternatives).  Pregnant Patients (“epidural”):  I understand that the risks of having an epidural (medicine given into my back to help control pain during labor), include itching, low blood pressure, difficulty urinating, headache or slowing of the baby’s heart. Very rare risks include infection, bleeding, seizure, irregular heart rhythms and nerve injury.  Regional Anesthesia (“spinal”, “epidural”, & “nerve blocks”):  I understand that rare but potential complications include headache, bleeding, infection, seizure, irregular heart rhythms, and nerve injury.    I can change my mind about having anesthesia services at any time before I get the medicine.    _____________________________________________________________________________  Patient (or Representative) Signature/Relationship to Patient  Date   Time    _____________________________________________________________________________   Name (if used)    Language/Organization   Time    _____________________________________________________________________________  Anesthesiologist Signature     Date   Time  I have discussed the procedure and information above with the patient (or patient’s representative) and answered their questions. The patient or their representative has agreed to have anesthesia services.    _____________________________________________________________________________  Witness        Date   Time  I have verified that the signature is that of the patient or patient’s representative, and that it was signed before the procedure  Patient Name: Radha Murry     : 1990                 Printed: 2024     Medical Record #: [id number]                     Page 2 of 2

## (undated) NOTE — ED AVS SNAPSHOT
Juan Walsh   MRN: [id number]    Department:  [facility] Emergency Department   Date of Visit:  7/17/2019           Disclosure     Insurance plans vary and the physician(s) referred by the ER may not be covered by your plan.  Please contact your tell this physician (or your personal doctor if your instructions are to return to your personal doctor) about any new or lasting problems. The primary care or specialist physician will see patients referred from the [facility] Emergency Department.  Geena Truong